# Patient Record
Sex: FEMALE | Race: WHITE | URBAN - METROPOLITAN AREA
[De-identification: names, ages, dates, MRNs, and addresses within clinical notes are randomized per-mention and may not be internally consistent; named-entity substitution may affect disease eponyms.]

---

## 2021-12-07 ENCOUNTER — OFFICE (OUTPATIENT)
Dept: URBAN - METROPOLITAN AREA CLINIC 34 | Facility: CLINIC | Age: 46
End: 2021-12-07

## 2021-12-07 VITALS
HEIGHT: 62 IN | WEIGHT: 145 LBS | DIASTOLIC BLOOD PRESSURE: 75 MMHG | HEART RATE: 65 BPM | TEMPERATURE: 97.5 F | SYSTOLIC BLOOD PRESSURE: 106 MMHG

## 2021-12-07 DIAGNOSIS — K59.09 OTHER CONSTIPATION: ICD-10-CM

## 2021-12-07 DIAGNOSIS — Z12.11 ENCOUNTER FOR SCREENING FOR MALIGNANT NEOPLASM OF COLON: ICD-10-CM

## 2021-12-07 DIAGNOSIS — K62.3 RECTAL PROLAPSE: ICD-10-CM

## 2021-12-07 DIAGNOSIS — Z83.71 FAMILY HISTORY OF COLONIC POLYPS: ICD-10-CM

## 2021-12-07 PROCEDURE — 99203 OFFICE O/P NEW LOW 30 MIN: CPT

## 2021-12-07 PROCEDURE — 00031: CPT | Performed by: INTERNAL MEDICINE

## 2021-12-07 NOTE — SERVICEHPINOTES
Franklin Lu is a 47 yo WF who is referred by Yazmin Vang NP for a consultation for colonoscopy. Reports occasional constipation which resolves with dietary changes. Denies diarrhea, blood in the stool, melena, abdominal pain, weight loss, fever or chills. Patient denies family history of colon cancer, but mom had colon polyps. No cardiac or pulmonary issues.br
br Pt states that she has a rectocele prolapse after her 3rd childbirth 10 yrs ago. However, she feels it inside the vaginal wall. She's concerned if it'll develop a problem in the future. Reports she uses her fingers to hold the vaginal area when she does BM. Advised her to consult with her GYN provider and PCP after colonoscopy.

## 2022-01-11 ENCOUNTER — OFFICE (OUTPATIENT)
Dept: URBAN - METROPOLITAN AREA CLINIC 98 | Facility: CLINIC | Age: 47
End: 2022-01-11
Payer: OTHER GOVERNMENT

## 2022-01-11 VITALS
TEMPERATURE: 97.7 F | RESPIRATION RATE: 16 BRPM | HEART RATE: 61 BPM | HEART RATE: 82 BPM | SYSTOLIC BLOOD PRESSURE: 97 MMHG | SYSTOLIC BLOOD PRESSURE: 100 MMHG | SYSTOLIC BLOOD PRESSURE: 111 MMHG | DIASTOLIC BLOOD PRESSURE: 61 MMHG | DIASTOLIC BLOOD PRESSURE: 62 MMHG | HEART RATE: 60 BPM | DIASTOLIC BLOOD PRESSURE: 59 MMHG | SYSTOLIC BLOOD PRESSURE: 108 MMHG | HEART RATE: 73 BPM | OXYGEN SATURATION: 100 % | DIASTOLIC BLOOD PRESSURE: 55 MMHG | DIASTOLIC BLOOD PRESSURE: 69 MMHG | HEART RATE: 72 BPM | HEART RATE: 70 BPM | TEMPERATURE: 98.1 F | HEART RATE: 62 BPM | RESPIRATION RATE: 14 BRPM | WEIGHT: 145 LBS | HEIGHT: 62 IN | OXYGEN SATURATION: 99 % | DIASTOLIC BLOOD PRESSURE: 64 MMHG | OXYGEN SATURATION: 98 % | SYSTOLIC BLOOD PRESSURE: 87 MMHG | SYSTOLIC BLOOD PRESSURE: 93 MMHG

## 2022-01-11 DIAGNOSIS — Z12.11 ENCOUNTER FOR SCREENING FOR MALIGNANT NEOPLASM OF COLON: ICD-10-CM

## 2022-01-11 DIAGNOSIS — Z83.71 FAMILY HISTORY OF COLONIC POLYPS: ICD-10-CM
